# Patient Record
Sex: FEMALE | Race: OTHER | HISPANIC OR LATINO
[De-identification: names, ages, dates, MRNs, and addresses within clinical notes are randomized per-mention and may not be internally consistent; named-entity substitution may affect disease eponyms.]

---

## 2021-12-13 ENCOUNTER — APPOINTMENT (OUTPATIENT)
Dept: PEDIATRIC CARDIOLOGY | Facility: CLINIC | Age: 36
End: 2021-12-13

## 2022-01-27 ENCOUNTER — APPOINTMENT (OUTPATIENT)
Dept: BARIATRICS/WEIGHT MGMT | Facility: CLINIC | Age: 37
End: 2022-01-27
Payer: MEDICAID

## 2022-01-27 VITALS — WEIGHT: 158 LBS | BODY MASS INDEX: 26.98 KG/M2 | HEIGHT: 64 IN

## 2022-01-27 DIAGNOSIS — O09.40 GESTATIONAL DIABETES MELLITUS IN PREGNANCY, UNSPECIFIED CONTROL: ICD-10-CM

## 2022-01-27 DIAGNOSIS — Z83.3 FAMILY HISTORY OF DIABETES MELLITUS: ICD-10-CM

## 2022-01-27 DIAGNOSIS — O24.419 GESTATIONAL DIABETES MELLITUS IN PREGNANCY, UNSPECIFIED CONTROL: ICD-10-CM

## 2022-01-27 DIAGNOSIS — O24.410 GESTATIONAL DIABETES MELLITUS IN PREGNANCY, DIET CONTROLLED: ICD-10-CM

## 2022-01-27 DIAGNOSIS — Z00.00 ENCOUNTER FOR GENERAL ADULT MEDICAL EXAMINATION W/OUT ABNORMAL FINDINGS: ICD-10-CM

## 2022-01-27 PROCEDURE — 99443: CPT | Mod: 95

## 2022-01-27 RX ORDER — IRON/IRON ASP GLY/FA/MV-MIN 38 125-25-1MG
TABLET ORAL
Refills: 0 | Status: ACTIVE | COMMUNITY

## 2022-01-27 NOTE — HISTORY OF PRESENT ILLNESS
[Finger Stick] : Finger Stick: Yes [Continuous Glucose Monitoring] : Continuous Glucose Monitoring: No [FreeTextEntry9] : 75 [FreeTextEntry1] : Patient counseled on diagnosis and pathophysiology of gestational diabetes.  Increased risk of developing DMT2 in the future after pregnancy and need for regular screenings.  Reviewed possible complications including macrosomia, delivery complications, and  hypoglycemia.\par Reviewed glucometer procedure and patient demonstrated back proper technique.  instructed to check her BG 4x daily- fasting for BG target <90 and 1 Hr PP for BG target of <140.  Patient will keep logbook and bring it with her to call OB appointments.  \par Reviewed carbohydrate portion sizes and importance of avoiding simple carbohydrates.  Increase water intake\par Encouraged daily walking\par

## 2022-01-27 NOTE — CONSULT LETTER
[Dear  ___] : Dear  [unfilled], [Consult Letter:] : I had the pleasure of evaluating your patient, [unfilled]. [( Thank you for referring [unfilled] for consultation for _____ )] : Thank you for referring [unfilled] for consultation for [unfilled] [Please see my note below.] : Please see my note below. [Consult Closing:] : Thank you very much for allowing me to participate in the care of this patient.  If you have any questions, please do not hesitate to contact me. [Sincerely,] : Sincerely, [FreeTextEntry3] : Irene Dobson FNP-BC

## 2022-01-27 NOTE — PAST MEDICAL HISTORY
[Total Preg ___] : G[unfilled] [Live Births ___] : P[unfilled]  [Abortions ___] : Abortions:[unfilled] [Living ___] : Living: [unfilled]

## 2022-01-27 NOTE — REASON FOR VISIT
[Gestational Diabetes] : gestational diabetes [Time Spent: ____ minutes] : Total time spent using  services: [unfilled] minutes. The patient's primary language is not English thus required  services. [Home] : at home, [unfilled] , at the time of the visit. [Other Location: e.g. Home (Enter Location, City,State)___] : at [unfilled] [] :  [Other:____] : [unfilled] [[employer] ] : provided by [employer]   [Verbal consent obtained from patient] : the patient, [unfilled] [Initial Evaluation] : an initial evaluation [Interpreters_IDNumber] : [id number] [Interpreters_FullName] : Sherri [FreeTextEntry2] : Aashish Perez [TWNoteComboBox1] : [place]

## 2022-01-27 NOTE — ASSESSMENT
[Carbohydrate Consistent Diet] : carbohydrate consistent diet [Importance of Diet and Exercise] : importance of diet and exercise to improve glycemic control, achieve weight loss and improve cardiovascular health [Exercise/Effect on Glucose] : exercise/effect on glucose [Self Monitoring of Blood Glucose] : self monitoring of blood glucose [Injection Technique, Storage, Sharps Disposal] : injection technique, storage, and sharps disposal [FreeTextEntry1] : 37 year old female with gestational diabetes diet controlled in her third trimester\par Freestyle glucometer and education provided on taking finger stick and target parameters. Patient was able to check her blood sugar during appt and value 75.\par Will check sugars fasting and 1 hour after each meal. Will keep a log of numbers and bring log and meter to OBGYN appts\par \par Counselled on lifestyle changes and importance of good glycemic control as well ask GDM risks to pregnancy and baby. Low carb diet and carb counting. 8-10 cups of water per day. Exercise walking 30 minutes x 5 days per week. Will discuss safe exercise with OB.\par \par Discussed the possibility of needing insulin to help control sugars in future and stay within target ranges

## 2022-01-27 NOTE — REASON FOR VISIT
[Gestational Diabetes] : gestational diabetes [Time Spent: ____ minutes] : Total time spent using  services: [unfilled] minutes. The patient's primary language is not English thus required  services. [Home] : at home, [unfilled] , at the time of the visit. [Other Location: e.g. Home (Enter Location, City,State)___] : at [unfilled] [] :  [Other:____] : [unfilled] [[employer] ] : provided by [employer]   [Verbal consent obtained from patient] : the patient, [unfilled] [Initial Evaluation] : an initial evaluation [Interpreters_IDNumber] : [id number] [Interpreters_FullName] : Sherri [FreeTextEntry2] : Aashish Perez [TWNoteComboBox1] : Chinese